# Patient Record
Sex: FEMALE | Race: BLACK OR AFRICAN AMERICAN
[De-identification: names, ages, dates, MRNs, and addresses within clinical notes are randomized per-mention and may not be internally consistent; named-entity substitution may affect disease eponyms.]

---

## 2018-12-26 ENCOUNTER — HOSPITAL ENCOUNTER (OUTPATIENT)
Dept: HOSPITAL 19 - LDRO | Age: 23
Discharge: HOME | End: 2018-12-26
Attending: OBSTETRICS & GYNECOLOGY
Payer: MEDICAID

## 2018-12-26 VITALS — HEIGHT: 55 IN | WEIGHT: 191.36 LBS

## 2018-12-26 VITALS — TEMPERATURE: 97.4 F | DIASTOLIC BLOOD PRESSURE: 65 MMHG | HEART RATE: 102 BPM | SYSTOLIC BLOOD PRESSURE: 123 MMHG

## 2018-12-26 VITALS — SYSTOLIC BLOOD PRESSURE: 109 MMHG | DIASTOLIC BLOOD PRESSURE: 56 MMHG | HEART RATE: 95 BPM

## 2018-12-26 DIAGNOSIS — O47.02: Primary | ICD-10-CM

## 2018-12-26 DIAGNOSIS — Z3A.27: ICD-10-CM

## 2018-12-26 DIAGNOSIS — R11.2: ICD-10-CM

## 2018-12-26 DIAGNOSIS — O26.893: ICD-10-CM

## 2019-02-04 ENCOUNTER — HOSPITAL ENCOUNTER (OUTPATIENT)
Dept: HOSPITAL 19 - LDRO | Age: 24
Discharge: HOME | End: 2019-02-04
Attending: OBSTETRICS & GYNECOLOGY
Payer: MEDICAID

## 2019-02-04 VITALS — SYSTOLIC BLOOD PRESSURE: 121 MMHG | HEART RATE: 115 BPM | TEMPERATURE: 98.9 F | DIASTOLIC BLOOD PRESSURE: 57 MMHG

## 2019-02-04 VITALS — HEIGHT: 65.98 IN | WEIGHT: 201.5 LBS | BODY MASS INDEX: 32.38 KG/M2

## 2019-02-04 VITALS — DIASTOLIC BLOOD PRESSURE: 58 MMHG | HEART RATE: 109 BPM | SYSTOLIC BLOOD PRESSURE: 116 MMHG

## 2019-02-04 VITALS — DIASTOLIC BLOOD PRESSURE: 71 MMHG | SYSTOLIC BLOOD PRESSURE: 124 MMHG | HEART RATE: 103 BPM

## 2019-02-04 DIAGNOSIS — Z3A.33: ICD-10-CM

## 2019-02-04 NOTE — NUR
SVE unchanged over 1 hour. Pt discharged home at this time. Return precautions
reviewed with patient. Discharge instructions reviewed, pt verbalized
understanding of all. Pt seen ambulating off unit with spouse.

## 2019-02-04 NOTE — NUR
at 33 weeks arrives to unit with complaint of contractions "every couple
of minutes" pt hasn't been able to time them. Pt reports in the car on the way
she had 3 contractions. Denies bleeding or LOF. Reports good FM. Pt oriented
to room. EFM and toco explained and applied. Plan of care reviewed. 
supportive at bedside. Admission assessment started. Vital signs obtained.
SVE - closed/thick/high

## 2019-03-06 ENCOUNTER — HOSPITAL ENCOUNTER (OUTPATIENT)
Dept: HOSPITAL 19 - LDRO | Age: 24
Discharge: HOME | End: 2019-03-06
Attending: OBSTETRICS & GYNECOLOGY
Payer: MEDICAID

## 2019-03-06 VITALS — DIASTOLIC BLOOD PRESSURE: 64 MMHG | SYSTOLIC BLOOD PRESSURE: 109 MMHG | TEMPERATURE: 98.3 F | HEART RATE: 98 BPM

## 2019-03-06 VITALS — BODY MASS INDEX: 33.02 KG/M2 | WEIGHT: 205.47 LBS | HEIGHT: 65.98 IN

## 2019-03-06 DIAGNOSIS — Z3A.37: ICD-10-CM

## 2019-03-06 NOTE — NUR
Pt arrived on unit ambulatory escorted by  and with complaints of
contractions "on and off all day", pt denies any leaking of fluid, vaginal
bleeding at this time and reports normal fetal movement. EFM and toco monitors
placed. SVE by this RN 1/50/-3. Vital signs WNL. Dr. Stroud on the unit. FHR
tracing reviewed. Orders for labor assessment received.

## 2019-03-06 NOTE — NUR
SVE by this RN with no change 1/50/-3. Spoke with Dr. Stroud for an update on
pt's status. Orders for discharge home received. Information reviewed with pt
and  at the bedside.

## 2019-03-19 ENCOUNTER — HOSPITAL ENCOUNTER (INPATIENT)
Dept: HOSPITAL 19 - LDR | Age: 24
LOS: 2 days | Discharge: HOME | End: 2019-03-21
Attending: OBSTETRICS & GYNECOLOGY | Admitting: OBSTETRICS & GYNECOLOGY
Payer: MEDICAID

## 2019-03-19 VITALS — BODY MASS INDEX: 33.02 KG/M2 | WEIGHT: 205.47 LBS | HEIGHT: 65.98 IN

## 2019-03-19 DIAGNOSIS — Z3A.39: ICD-10-CM

## 2019-03-19 DIAGNOSIS — Z28.21: ICD-10-CM

## 2019-03-19 DIAGNOSIS — M54.42: ICD-10-CM

## 2019-03-19 DIAGNOSIS — J45.909: ICD-10-CM

## 2019-03-19 DIAGNOSIS — M54.41: ICD-10-CM

## 2019-03-20 VITALS — DIASTOLIC BLOOD PRESSURE: 62 MMHG | SYSTOLIC BLOOD PRESSURE: 114 MMHG | HEART RATE: 96 BPM

## 2019-03-20 VITALS — HEART RATE: 87 BPM | DIASTOLIC BLOOD PRESSURE: 58 MMHG | SYSTOLIC BLOOD PRESSURE: 113 MMHG

## 2019-03-20 VITALS — HEART RATE: 95 BPM | SYSTOLIC BLOOD PRESSURE: 106 MMHG | DIASTOLIC BLOOD PRESSURE: 54 MMHG

## 2019-03-20 VITALS — SYSTOLIC BLOOD PRESSURE: 129 MMHG | HEART RATE: 94 BPM | DIASTOLIC BLOOD PRESSURE: 58 MMHG

## 2019-03-20 VITALS — SYSTOLIC BLOOD PRESSURE: 134 MMHG | DIASTOLIC BLOOD PRESSURE: 63 MMHG | HEART RATE: 96 BPM | TEMPERATURE: 98.1 F

## 2019-03-20 VITALS — DIASTOLIC BLOOD PRESSURE: 61 MMHG | HEART RATE: 84 BPM | SYSTOLIC BLOOD PRESSURE: 121 MMHG

## 2019-03-20 VITALS — HEART RATE: 110 BPM | SYSTOLIC BLOOD PRESSURE: 127 MMHG | TEMPERATURE: 98.9 F | DIASTOLIC BLOOD PRESSURE: 58 MMHG

## 2019-03-20 VITALS — DIASTOLIC BLOOD PRESSURE: 58 MMHG | SYSTOLIC BLOOD PRESSURE: 111 MMHG | HEART RATE: 94 BPM

## 2019-03-20 VITALS — DIASTOLIC BLOOD PRESSURE: 45 MMHG | HEART RATE: 87 BPM | SYSTOLIC BLOOD PRESSURE: 93 MMHG

## 2019-03-20 VITALS — DIASTOLIC BLOOD PRESSURE: 56 MMHG | SYSTOLIC BLOOD PRESSURE: 112 MMHG | HEART RATE: 99 BPM

## 2019-03-20 VITALS — HEART RATE: 86 BPM | DIASTOLIC BLOOD PRESSURE: 65 MMHG | SYSTOLIC BLOOD PRESSURE: 121 MMHG

## 2019-03-20 VITALS — DIASTOLIC BLOOD PRESSURE: 58 MMHG | HEART RATE: 90 BPM | SYSTOLIC BLOOD PRESSURE: 120 MMHG

## 2019-03-20 VITALS — DIASTOLIC BLOOD PRESSURE: 52 MMHG | SYSTOLIC BLOOD PRESSURE: 105 MMHG | HEART RATE: 81 BPM

## 2019-03-20 VITALS — SYSTOLIC BLOOD PRESSURE: 97 MMHG | HEART RATE: 86 BPM | DIASTOLIC BLOOD PRESSURE: 55 MMHG

## 2019-03-20 VITALS — HEART RATE: 80 BPM | SYSTOLIC BLOOD PRESSURE: 110 MMHG | DIASTOLIC BLOOD PRESSURE: 56 MMHG

## 2019-03-20 VITALS — HEART RATE: 118 BPM | SYSTOLIC BLOOD PRESSURE: 131 MMHG | DIASTOLIC BLOOD PRESSURE: 73 MMHG

## 2019-03-20 VITALS — SYSTOLIC BLOOD PRESSURE: 122 MMHG | DIASTOLIC BLOOD PRESSURE: 66 MMHG | HEART RATE: 84 BPM

## 2019-03-20 VITALS — HEART RATE: 110 BPM | DIASTOLIC BLOOD PRESSURE: 58 MMHG | SYSTOLIC BLOOD PRESSURE: 127 MMHG

## 2019-03-20 VITALS — SYSTOLIC BLOOD PRESSURE: 114 MMHG | DIASTOLIC BLOOD PRESSURE: 67 MMHG | HEART RATE: 114 BPM

## 2019-03-20 VITALS — DIASTOLIC BLOOD PRESSURE: 67 MMHG | HEART RATE: 96 BPM | SYSTOLIC BLOOD PRESSURE: 117 MMHG

## 2019-03-20 VITALS — SYSTOLIC BLOOD PRESSURE: 134 MMHG | HEART RATE: 99 BPM | DIASTOLIC BLOOD PRESSURE: 81 MMHG

## 2019-03-20 VITALS — SYSTOLIC BLOOD PRESSURE: 115 MMHG | DIASTOLIC BLOOD PRESSURE: 56 MMHG | HEART RATE: 91 BPM

## 2019-03-20 VITALS — HEART RATE: 88 BPM | SYSTOLIC BLOOD PRESSURE: 119 MMHG | DIASTOLIC BLOOD PRESSURE: 56 MMHG

## 2019-03-20 VITALS — DIASTOLIC BLOOD PRESSURE: 56 MMHG | HEART RATE: 107 BPM | SYSTOLIC BLOOD PRESSURE: 118 MMHG

## 2019-03-20 VITALS — HEART RATE: 87 BPM | SYSTOLIC BLOOD PRESSURE: 115 MMHG | DIASTOLIC BLOOD PRESSURE: 58 MMHG

## 2019-03-20 VITALS — DIASTOLIC BLOOD PRESSURE: 56 MMHG | HEART RATE: 97 BPM | SYSTOLIC BLOOD PRESSURE: 121 MMHG

## 2019-03-20 VITALS — SYSTOLIC BLOOD PRESSURE: 117 MMHG | DIASTOLIC BLOOD PRESSURE: 57 MMHG | HEART RATE: 90 BPM

## 2019-03-20 VITALS — DIASTOLIC BLOOD PRESSURE: 52 MMHG | HEART RATE: 100 BPM | SYSTOLIC BLOOD PRESSURE: 136 MMHG

## 2019-03-20 VITALS — DIASTOLIC BLOOD PRESSURE: 52 MMHG | HEART RATE: 93 BPM | SYSTOLIC BLOOD PRESSURE: 110 MMHG

## 2019-03-20 VITALS — SYSTOLIC BLOOD PRESSURE: 115 MMHG | DIASTOLIC BLOOD PRESSURE: 57 MMHG | HEART RATE: 108 BPM

## 2019-03-20 VITALS — DIASTOLIC BLOOD PRESSURE: 77 MMHG | HEART RATE: 107 BPM | SYSTOLIC BLOOD PRESSURE: 131 MMHG

## 2019-03-20 VITALS — HEART RATE: 90 BPM | SYSTOLIC BLOOD PRESSURE: 92 MMHG | DIASTOLIC BLOOD PRESSURE: 55 MMHG

## 2019-03-20 VITALS — DIASTOLIC BLOOD PRESSURE: 56 MMHG | HEART RATE: 95 BPM | SYSTOLIC BLOOD PRESSURE: 114 MMHG

## 2019-03-20 VITALS — DIASTOLIC BLOOD PRESSURE: 60 MMHG | SYSTOLIC BLOOD PRESSURE: 125 MMHG | HEART RATE: 103 BPM

## 2019-03-20 VITALS — HEART RATE: 116 BPM | SYSTOLIC BLOOD PRESSURE: 114 MMHG | DIASTOLIC BLOOD PRESSURE: 63 MMHG

## 2019-03-20 VITALS — SYSTOLIC BLOOD PRESSURE: 129 MMHG | DIASTOLIC BLOOD PRESSURE: 62 MMHG | HEART RATE: 115 BPM

## 2019-03-20 VITALS — SYSTOLIC BLOOD PRESSURE: 120 MMHG | DIASTOLIC BLOOD PRESSURE: 51 MMHG | HEART RATE: 96 BPM

## 2019-03-20 VITALS — HEART RATE: 100 BPM | SYSTOLIC BLOOD PRESSURE: 124 MMHG | DIASTOLIC BLOOD PRESSURE: 58 MMHG

## 2019-03-20 VITALS — SYSTOLIC BLOOD PRESSURE: 148 MMHG | DIASTOLIC BLOOD PRESSURE: 90 MMHG | HEART RATE: 111 BPM

## 2019-03-20 LAB
BASOPHILS # BLD: 0 10*3/UL (ref 0–0.2)
BASOPHILS NFR BLD AUTO: 0.1 % (ref 0–2)
EOSINOPHIL # BLD: 0.1 10*3/UL (ref 0–0.7)
EOSINOPHIL NFR BLD: 1.6 % (ref 0–4)
ERYTHROCYTE [DISTWIDTH] IN BLOOD BY AUTOMATED COUNT: 17.2 % (ref 11.5–14.5)
GRANULOCYTES # BLD AUTO: 62.1 % (ref 42.2–75.2)
HCT VFR BLD AUTO: 35.1 % (ref 37–47)
HGB BLD-MCNC: 11 G/DL (ref 12.5–16)
LYMPHOCYTES # BLD: 1.8 10*3/UL (ref 1.2–3.4)
LYMPHOCYTES NFR BLD: 26.9 % (ref 20–51)
MCH RBC QN AUTO: 25 PG (ref 27–31)
MCHC RBC AUTO-ENTMCNC: 31 G/DL (ref 33–37)
MCV RBC AUTO: 78 FL (ref 80–100)
MONOCYTES # BLD: 0.6 10*3/UL (ref 0.1–0.6)
MONOCYTES NFR BLD AUTO: 8.7 % (ref 1.7–9.3)
NEUTROPHILS # BLD: 4.2 10*3/UL (ref 1.4–6.5)
PLATELET # BLD AUTO: 244 K/MM3 (ref 130–400)
PMV BLD AUTO: 9.3 FL (ref 7.4–10.4)
RBC # BLD AUTO: 4.49 M/MM3 (ref 4.1–5.3)

## 2019-03-20 PROCEDURE — 10907ZC DRAINAGE OF AMNIOTIC FLUID, THERAPEUTIC FROM PRODUCTS OF CONCEPTION, VIA NATURAL OR ARTIFICIAL OPENING: ICD-10-PCS | Performed by: OBSTETRICS & GYNECOLOGY

## 2019-03-20 PROCEDURE — 3E033VJ INTRODUCTION OF OTHER HORMONE INTO PERIPHERAL VEIN, PERCUTANEOUS APPROACH: ICD-10-PCS | Performed by: OBSTETRICS & GYNECOLOGY

## 2019-03-20 PROCEDURE — 0KQM0ZZ REPAIR PERINEUM MUSCLE, OPEN APPROACH: ICD-10-PCS | Performed by: OBSTETRICS & GYNECOLOGY

## 2019-03-20 NOTE — NUR
in patient room. Patient begins to push with contractions. Per 
, infant noted OP. Intermittent tracing of FHT's inbetween contractions. Nurse
at bedside with patient. Monitoring with physician.

## 2019-03-20 NOTE — NUR
1330- CRNA in patient room at bedside for start of epidural. Time out
performed. Patient states understanding of procedure. Patient into place.
1352- Test dose given, patient tolerated ell.
1358- Patient laid down into wedge right. Will continue to monitor.

## 2019-03-20 NOTE — NUR
1150-  at patient bedside. Discuss plan of care. SVE completed, patient
noted with change. AROM with clear fluids noted, patient tolerated well.
Patient may have epidural when request. Will continue to monitor.

## 2019-03-20 NOTE — NUR
1628- Dr. Stroud in patient room, christen removed. Nursery RN to room for
delivery. Bed broke down.

## 2019-03-20 NOTE — NUR
1735- Delivery of viable male infant.  suctioned infant mother, infant
to mothers chest for drying and stimulation, to the care of nursery RN. Infant
cord remained intact until stop pulsating, Dr. Stroud clamps cord, father of
baby cuts cord.
1740- Delivery of placenta. Dr. Stroud notes bright bleeding, fundal massage
performed by Dr. Stroud. Nurse provides support and comfort to patient. Pitocin
started per protcol of 333ml/hr. Continue Fundal massage performed by Dr. Stroud. Request of suture for perineum repair for 2nd degree tear.
Dr. Stroud continues to provide fundal massage. Instructed for 1 dose Im
methergine given in right thigh at 1745. Continued fundal massage by physician
and nurse. 2nd dose IM methergine orderd and administered 1752 left thigh.
continue fundal massage perfomed, noted firm uterus, noted with bleeding.
800mcg of cytotec given rectally by  at 1759.  completes repair.
Fundal massage peformed with scant bleeding, noted firm uterus. Will continue
to monitor. Ice pack applied. Patient resting comfortably in bed.

## 2019-03-20 NOTE — NUR
SVE completed, patient tolerated well. Dr. Stroud called and notified of update
on patient status, will continue to monitor.

## 2019-03-20 NOTE — NUR
Dr. Stroud calls for update, informed patient recieving epidural, will notify
when SVE completed for update.

## 2019-03-20 NOTE — NUR
Dr. Stroud at patient beside, bedside ultrasound completed noted vertex. SVE
completed noted 1/thick. Patient tolerated well.Dicuss plan for pitocin per
Dr. Stroud increase pitocin to 40. Will not break water at this time, no
epidural until progression of dilation. Patient states understanding of plan
of care. Monitors adjusted for monitoring of FHT.

## 2019-03-20 NOTE — NUR
Patient spouse to desk, states patient is feeling pressure, "like his head is
right down there". Nurse into room. SVE completed, noted patient is complete.
Dr. Stroud called and notified of patient status at 1617, Nursery RN informed of
patient status, charge nurse informed, and ob tech informed. Begin prep for
delivery. Room prepped.

## 2019-03-20 NOTE — NUR
Intermittent tracing of FHT's patient moved from birthing ball to bed,
repostioned. Monitors adjusted for tracing, nurse at bedside.

## 2019-03-20 NOTE — NUR
Patient presents to floor for scheduled induction. Patient reports
contractions, denies leaking of fluid or bleeding, patient orientated to room
with spouse. Patient into bed, monitors in place, consents signed, IV started
in left hand. Patient tolerated well. Labs obtained, fluid infusing. Discussed
plan of care for the day and encouraged questions or concerns. Answered as
appropriate.
Spouse at bedside.
FHR with intermittent tracing, nurse continues to adjust monitor as
appropriate.
0820-  at patient bedside, discuss plan of care with patient.

## 2019-03-21 VITALS — DIASTOLIC BLOOD PRESSURE: 65 MMHG | SYSTOLIC BLOOD PRESSURE: 118 MMHG | HEART RATE: 94 BPM | TEMPERATURE: 98.1 F

## 2019-03-21 VITALS — SYSTOLIC BLOOD PRESSURE: 113 MMHG | TEMPERATURE: 97.6 F | HEART RATE: 87 BPM | DIASTOLIC BLOOD PRESSURE: 63 MMHG

## 2019-03-21 VITALS — TEMPERATURE: 98 F | DIASTOLIC BLOOD PRESSURE: 61 MMHG | HEART RATE: 87 BPM | SYSTOLIC BLOOD PRESSURE: 123 MMHG

## 2019-03-21 VITALS — SYSTOLIC BLOOD PRESSURE: 116 MMHG | HEART RATE: 96 BPM | DIASTOLIC BLOOD PRESSURE: 60 MMHG | TEMPERATURE: 98.5 F

## 2019-03-21 LAB
ERYTHROCYTE [DISTWIDTH] IN BLOOD BY AUTOMATED COUNT: 17 % (ref 11.5–14.5)
HCT VFR BLD AUTO: 38 % (ref 37–47)
HGB BLD-MCNC: 11.6 G/DL (ref 12.5–16)
LYMPHOCYTES NFR BLD MANUAL: 25 % (ref 20–51)
MCH RBC QN AUTO: 24 PG (ref 27–31)
MCHC RBC AUTO-ENTMCNC: 31 G/DL (ref 33–37)
MCV RBC AUTO: 80 FL (ref 80–100)
METAMYELOCYTES NFR BLD MANUAL: 1 % (ref 0–0)
MONOCYTES NFR BLD: 10 % (ref 1.7–9.3)
NEUTS BAND NFR BLD: 5 % (ref 0–10)
NEUTS SEG NFR BLD MANUAL: 59 % (ref 42–75.2)
OVALOCYTES BLD QL SMEAR: (no result)
PLATELET # BLD AUTO: 242 K/MM3 (ref 130–400)
PLATELET BLD QL SMEAR: NORMAL
PMV BLD AUTO: 9 FL (ref 7.4–10.4)
RBC # BLD AUTO: 4.78 M/MM3 (ref 4.1–5.3)
TARGETS BLD QL SMEAR: (no result)

## 2019-03-21 NOTE — NUR
Discharge instructions explained and questions answered. Patient ambulatory
off unit with , , and OB tech at 1945.